# Patient Record
Sex: FEMALE | Race: WHITE | NOT HISPANIC OR LATINO | Employment: OTHER | URBAN - METROPOLITAN AREA
[De-identification: names, ages, dates, MRNs, and addresses within clinical notes are randomized per-mention and may not be internally consistent; named-entity substitution may affect disease eponyms.]

---

## 2017-10-16 ENCOUNTER — OFFICE VISIT (OUTPATIENT)
Dept: WOUND CARE | Facility: HOSPITAL | Age: 57
End: 2017-10-16
Attending: SURGERY
Payer: MEDICARE

## 2017-10-16 VITALS — DIASTOLIC BLOOD PRESSURE: 107 MMHG | SYSTOLIC BLOOD PRESSURE: 147 MMHG | HEART RATE: 99 BPM | RESPIRATION RATE: 20 BRPM

## 2017-10-16 DIAGNOSIS — L97.812 ULCER OF RIGHT PRETIBIAL REGION, WITH FAT LAYER EXPOSED: Primary | ICD-10-CM

## 2017-10-16 PROCEDURE — 99202 OFFICE O/P NEW SF 15 MIN: CPT

## 2017-10-16 PROCEDURE — 27201912 HC WOUND CARE DEBRIDEMENT SUPPLIES

## 2017-10-16 PROCEDURE — 11042 DBRDMT SUBQ TIS 1ST 20SQCM/<: CPT

## 2017-10-16 RX ORDER — OXYCODONE HYDROCHLORIDE 5 MG/1
5 CAPSULE ORAL EVERY 4 HOURS PRN
COMMUNITY

## 2017-10-16 RX ORDER — ALPRAZOLAM 0.5 MG/1
0.5 TABLET ORAL 3 TIMES DAILY
COMMUNITY

## 2017-10-16 RX ORDER — CYCLOBENZAPRINE HCL 10 MG
10 TABLET ORAL 3 TIMES DAILY PRN
COMMUNITY

## 2017-10-16 RX ORDER — LAMOTRIGINE 200 MG/1
200 TABLET ORAL DAILY
COMMUNITY
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
## 2017-10-23 ENCOUNTER — OFFICE VISIT (OUTPATIENT)
Dept: WOUND CARE | Facility: HOSPITAL | Age: 57
End: 2017-10-23
Attending: SURGERY
Payer: MEDICARE

## 2017-10-23 VITALS
SYSTOLIC BLOOD PRESSURE: 136 MMHG | TEMPERATURE: 98 F | DIASTOLIC BLOOD PRESSURE: 101 MMHG | RESPIRATION RATE: 18 BRPM | HEART RATE: 100 BPM

## 2017-10-23 DIAGNOSIS — Z86.711 HISTORY OF PULMONARY EMBOLISM: ICD-10-CM

## 2017-10-23 DIAGNOSIS — T81.89XA NON-HEALING SURGICAL WOUND, INITIAL ENCOUNTER: ICD-10-CM

## 2017-10-23 DIAGNOSIS — L97.813: ICD-10-CM

## 2017-10-23 DIAGNOSIS — Z79.01 CURRENT USE OF ANTICOAGULANT THERAPY: ICD-10-CM
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
## 2017-10-23 PROCEDURE — 11043 DBRDMT MUSC&/FSCA 1ST 20/<: CPT

## 2017-10-23 PROCEDURE — 27201912 HC WOUND CARE DEBRIDEMENT SUPPLIES
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
## 2017-10-30 ENCOUNTER — OFFICE VISIT (OUTPATIENT)
Dept: WOUND CARE | Facility: HOSPITAL | Age: 57
End: 2017-10-30
Attending: SURGERY
Payer: MEDICARE

## 2017-10-30 VITALS
RESPIRATION RATE: 20 BRPM | HEART RATE: 96 BPM | SYSTOLIC BLOOD PRESSURE: 155 MMHG | DIASTOLIC BLOOD PRESSURE: 95 MMHG | TEMPERATURE: 98 F

## 2017-10-30 DIAGNOSIS — L97.813: ICD-10-CM

## 2017-10-30 PROCEDURE — 11045 DBRDMT SUBQ TISS EACH ADDL: CPT

## 2017-10-30 PROCEDURE — 27201912 HC WOUND CARE DEBRIDEMENT SUPPLIES

## 2017-10-30 PROCEDURE — 11042 DBRDMT SUBQ TIS 1ST 20SQCM/<: CPT
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
## 2017-10-30 NOTE — ASSESSMENT & PLAN NOTE
We'll try to obtain and review the records from her hospitalization.  Consider skin graft to the wound.  We'll like to stop anticoagulation for surgery but before doing so would like to review her previous hospital records

## 2017-11-13 ENCOUNTER — OFFICE VISIT (OUTPATIENT)
Dept: WOUND CARE | Facility: HOSPITAL | Age: 57
End: 2017-11-13
Attending: SURGERY
Payer: MEDICARE

## 2017-11-13 VITALS — RESPIRATION RATE: 20 BRPM | HEART RATE: 92 BPM | DIASTOLIC BLOOD PRESSURE: 104 MMHG | SYSTOLIC BLOOD PRESSURE: 166 MMHG

## 2017-11-13 DIAGNOSIS — L97.813: ICD-10-CM

## 2017-11-13 PROCEDURE — 27201912 HC WOUND CARE DEBRIDEMENT SUPPLIES

## 2017-11-13 PROCEDURE — 11042 DBRDMT SUBQ TIS 1ST 20SQCM/<: CPT
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
## 2017-12-04 ENCOUNTER — OFFICE VISIT (OUTPATIENT)
Dept: WOUND CARE | Facility: HOSPITAL | Age: 57
End: 2017-12-04
Attending: SURGERY
Payer: MEDICARE

## 2017-12-04 VITALS
SYSTOLIC BLOOD PRESSURE: 124 MMHG | HEART RATE: 86 BPM | DIASTOLIC BLOOD PRESSURE: 85 MMHG | RESPIRATION RATE: 20 BRPM | TEMPERATURE: 98 F

## 2017-12-04 DIAGNOSIS — L97.813: ICD-10-CM

## 2017-12-04 PROCEDURE — 99211 OFF/OP EST MAY X REQ PHY/QHP: CPT
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
## 2018-02-14 ENCOUNTER — TELEPHONE (OUTPATIENT)
Dept: ORTHOPEDICS | Facility: CLINIC | Age: 58
End: 2018-02-14

## 2018-02-14 NOTE — TELEPHONE ENCOUNTER
Spoke with pt.  Scheduled an appointment with Dr Page for a second opinion as requested by Dr Campbell.   Faxed info received from Dr Campbell' office.  Advised pt to bring any outside images she is able to locate with her.  Appointment info mailed to pt.  Pt verbalized understanding.

## 2018-02-20 ENCOUNTER — HOSPITAL ENCOUNTER (OUTPATIENT)
Dept: RADIOLOGY | Facility: HOSPITAL | Age: 58
Discharge: HOME | End: 2018-02-20
Attending: ORTHOPAEDIC SURGERY
Payer: MEDICARE

## 2018-02-20 ENCOUNTER — OFFICE VISIT (OUTPATIENT)
Dept: ORTHOPEDICS | Facility: CLINIC | Age: 58
End: 2018-02-20
Payer: MEDICARE

## 2018-02-20 VITALS — HEIGHT: 65 IN | BODY MASS INDEX: 29.73 KG/M2 | WEIGHT: 178.44 LBS

## 2018-02-20 DIAGNOSIS — S82.201C: ICD-10-CM

## 2018-02-20 DIAGNOSIS — S72.351A DISPLACED COMMINUTED FRACTURE OF SHAFT OF RIGHT FEMUR, INITIAL ENCOUNTER FOR CLOSED FRACTURE: ICD-10-CM

## 2018-02-20 DIAGNOSIS — T84.84XA PAINFUL ORTHOPAEDIC HARDWARE: ICD-10-CM

## 2018-02-20 DIAGNOSIS — S14.109A INJURY OF CERVICAL SPINE, INITIAL ENCOUNTER: ICD-10-CM

## 2018-02-20 DIAGNOSIS — S82.401C: ICD-10-CM

## 2018-02-20 DIAGNOSIS — S82.232A CLOSED DISPLACED OBLIQUE FRACTURE OF SHAFT OF LEFT TIBIA, INITIAL ENCOUNTER: ICD-10-CM

## 2018-02-20 DIAGNOSIS — S72.351A DISPLACED COMMINUTED FRACTURE OF SHAFT OF RIGHT FEMUR, INITIAL ENCOUNTER FOR CLOSED FRACTURE: Primary | ICD-10-CM

## 2018-02-20 PROCEDURE — 99999 PR PBB SHADOW E&M-EST. PATIENT-LVL III: CPT | Mod: PBBFAC,,, | Performed by: ORTHOPAEDIC SURGERY

## 2018-02-20 PROCEDURE — 72050 X-RAY EXAM NECK SPINE 4/5VWS: CPT | Mod: TC

## 2018-02-20 PROCEDURE — 3008F BODY MASS INDEX DOCD: CPT | Mod: S$GLB,,, | Performed by: ORTHOPAEDIC SURGERY

## 2018-02-20 PROCEDURE — 72050 X-RAY EXAM NECK SPINE 4/5VWS: CPT | Mod: 26,,, | Performed by: RADIOLOGY

## 2018-02-20 PROCEDURE — 73552 X-RAY EXAM OF FEMUR 2/>: CPT | Mod: TC,RT

## 2018-02-20 PROCEDURE — 73552 X-RAY EXAM OF FEMUR 2/>: CPT | Mod: 26,RT,, | Performed by: RADIOLOGY

## 2018-02-20 PROCEDURE — 73590 X-RAY EXAM OF LOWER LEG: CPT | Mod: 26,LT,, | Performed by: RADIOLOGY

## 2018-02-20 PROCEDURE — 99204 OFFICE O/P NEW MOD 45 MIN: CPT | Mod: S$GLB,,, | Performed by: ORTHOPAEDIC SURGERY

## 2018-02-20 PROCEDURE — 73590 X-RAY EXAM OF LOWER LEG: CPT | Mod: TC,LT

## 2018-02-20 RX ORDER — DICLOFENAC SODIUM 50 MG/1
TABLET, DELAYED RELEASE ORAL
Refills: 0 | COMMUNITY
Start: 2017-12-12

## 2018-02-20 RX ORDER — APIXABAN 5 MG/1
TABLET, FILM COATED ORAL
Refills: 11 | COMMUNITY
Start: 2018-01-28

## 2018-02-20 RX ORDER — LEVETIRACETAM 250 MG/1
TABLET ORAL
Refills: 3 | COMMUNITY
Start: 2018-01-19

## 2018-02-20 RX ORDER — AMLODIPINE BESYLATE 5 MG/1
TABLET ORAL
Refills: 3 | COMMUNITY
Start: 2018-02-10

## 2018-02-20 RX ORDER — PROCHLORPERAZINE MALEATE 10 MG
TABLET ORAL
Refills: 0 | COMMUNITY
Start: 2017-12-12

## 2018-02-20 NOTE — LETTER
February 23, 2018      Issa Campbell MD  [address]   [address]  [facility] [address]           [facility]  [address]  [facility] [address]  Phone: [phone]          Patient: Waleska Polk   MR Number: [id number]   YOB: 1960   Date of Visit: 2/20/2018       Dear Dr. Issa Campbell:    Thank you for referring Waleska Polk to me for evaluation. Attached you will find relevant portions of my assessment and plan of care.    If you have questions, please do not hesitate to call me. I look forward to following Waleska Polk along with you.    Sincerely,    Wayne Page MD    Enclosure  CC:  No Recipients    If you would like to receive this communication electronically, please contact [email or web address] or [phone] to request more information on [employer] access.    For providers and/or their staff who would like to refer a patient to [facility], please contact us through our one-stop-shop provider referral line, [facility], at [phone].    If you feel you have received this communication in error or would no longer like to receive these types of communications, please e-mail [email or web address]

## 2018-02-21 ENCOUNTER — TELEPHONE (OUTPATIENT)
Dept: ORTHOPEDICS | Facility: CLINIC | Age: 58
End: 2018-02-21

## 2018-02-21 NOTE — TELEPHONE ENCOUNTER
----- Message from Renee Carias sent at 2/21/2018 11:35 AM CST -----  Contact: self   Pt is requesting a call back regarding her test results for the tests that was taken on yesterday. [phone]

## 2018-02-21 NOTE — TELEPHONE ENCOUNTER
Spoke with pt.  Advised that I will call her with an update and next step directions once Dr Page has reviewed everything.  Pt verbalized understanding.
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
## 2018-03-08 ENCOUNTER — HOSPITAL ENCOUNTER (OUTPATIENT)
Dept: RADIOLOGY | Facility: HOSPITAL | Age: 58
Discharge: HOME | End: 2018-03-08
Attending: ORTHOPAEDIC SURGERY
Payer: MEDICARE

## 2018-03-08 DIAGNOSIS — S82.201C: ICD-10-CM

## 2018-03-08 DIAGNOSIS — S72.351A DISPLACED COMMINUTED FRACTURE OF SHAFT OF RIGHT FEMUR, INITIAL ENCOUNTER FOR CLOSED FRACTURE: ICD-10-CM

## 2018-03-08 DIAGNOSIS — S82.232A CLOSED DISPLACED OBLIQUE FRACTURE OF SHAFT OF LEFT TIBIA, INITIAL ENCOUNTER: ICD-10-CM

## 2018-03-08 DIAGNOSIS — S82.401C: ICD-10-CM

## 2018-03-08 PROCEDURE — 73590 X-RAY EXAM OF LOWER LEG: CPT | Mod: 26,RT,, | Performed by: RADIOLOGY

## 2018-03-08 PROCEDURE — 78805 NM INFLAMMATORY LOCALIZATION LTD INDIUM: CPT | Mod: 26,,, | Performed by: RADIOLOGY

## 2018-03-08 PROCEDURE — 73590 X-RAY EXAM OF LOWER LEG: CPT | Mod: TC,FY,RT

## 2018-03-08 PROCEDURE — 78102 BONE MARROW IMAGING LTD: CPT | Mod: 26,,, | Performed by: RADIOLOGY

## 2018-03-08 PROCEDURE — A9541 TC99M SULFUR COLLOID: HCPCS

## 2018-03-08 PROCEDURE — 78102 BONE MARROW IMAGING LTD: CPT | Mod: TC

## 2018-03-09 ENCOUNTER — HOSPITAL ENCOUNTER (OUTPATIENT)
Dept: RADIOLOGY | Facility: HOSPITAL | Age: 58
Discharge: HOME | End: 2018-03-09
Attending: ORTHOPAEDIC SURGERY
Payer: MEDICARE

## 2018-03-09 PROCEDURE — 78805 NM INFLAMMATORY LOCALIZATION LTD INDIUM: CPT | Mod: TC
